# Patient Record
Sex: FEMALE | Race: BLACK OR AFRICAN AMERICAN | NOT HISPANIC OR LATINO | Employment: FULL TIME | URBAN - METROPOLITAN AREA
[De-identification: names, ages, dates, MRNs, and addresses within clinical notes are randomized per-mention and may not be internally consistent; named-entity substitution may affect disease eponyms.]

---

## 2019-09-20 ENCOUNTER — CLINICAL SUPPORT (OUTPATIENT)
Dept: LAB | Facility: HOSPITAL | Age: 29
End: 2019-09-20
Attending: INTERNAL MEDICINE
Payer: COMMERCIAL

## 2019-09-20 ENCOUNTER — TELEPHONE (OUTPATIENT)
Dept: GASTROENTEROLOGY | Facility: CLINIC | Age: 29
End: 2019-09-20

## 2019-09-20 ENCOUNTER — OFFICE VISIT (OUTPATIENT)
Dept: GASTROENTEROLOGY | Facility: CLINIC | Age: 29
End: 2019-09-20
Payer: COMMERCIAL

## 2019-09-20 ENCOUNTER — PATIENT MESSAGE (OUTPATIENT)
Dept: GASTROENTEROLOGY | Facility: CLINIC | Age: 29
End: 2019-09-20

## 2019-09-20 VITALS — DIASTOLIC BLOOD PRESSURE: 85 MMHG | SYSTOLIC BLOOD PRESSURE: 120 MMHG | WEIGHT: 104.94 LBS

## 2019-09-20 DIAGNOSIS — Z51.81 MEDICATION MONITORING ENCOUNTER: ICD-10-CM

## 2019-09-20 DIAGNOSIS — G43.A0 CYCLICAL VOMITING WITH NAUSEA, INTRACTABILITY OF VOMITING NOT SPECIFIED: Primary | ICD-10-CM

## 2019-09-20 DIAGNOSIS — R17 ELEVATED BILIRUBIN: ICD-10-CM

## 2019-09-20 DIAGNOSIS — G43.A0 CYCLICAL VOMITING WITH NAUSEA, INTRACTABILITY OF VOMITING NOT SPECIFIED: ICD-10-CM

## 2019-09-20 PROCEDURE — 99999 PR PBB SHADOW E&M-NEW PATIENT-LVL III: ICD-10-PCS | Mod: PBBFAC,,, | Performed by: INTERNAL MEDICINE

## 2019-09-20 PROCEDURE — 99204 PR OFFICE/OUTPT VISIT, NEW, LEVL IV, 45-59 MIN: ICD-10-PCS | Mod: S$GLB,,, | Performed by: INTERNAL MEDICINE

## 2019-09-20 PROCEDURE — 99999 PR PBB SHADOW E&M-NEW PATIENT-LVL III: CPT | Mod: PBBFAC,,, | Performed by: INTERNAL MEDICINE

## 2019-09-20 PROCEDURE — 93005 ELECTROCARDIOGRAM TRACING: CPT

## 2019-09-20 PROCEDURE — 99204 OFFICE O/P NEW MOD 45 MIN: CPT | Mod: S$GLB,,, | Performed by: INTERNAL MEDICINE

## 2019-09-20 RX ORDER — LORAZEPAM 1 MG/1
1 TABLET ORAL EVERY 8 HOURS PRN
Qty: 20 TABLET | Refills: 0 | Status: SHIPPED | OUTPATIENT
Start: 2019-09-20 | End: 2019-10-20

## 2019-09-20 RX ORDER — ONDANSETRON 4 MG/1
8 TABLET, FILM COATED ORAL 2 TIMES DAILY
COMMUNITY

## 2019-09-20 RX ORDER — PROMETHAZINE HYDROCHLORIDE 12.5 MG/1
12.5 SUPPOSITORY RECTAL EVERY 6 HOURS PRN
Qty: 12 SUPPOSITORY | Refills: 2 | Status: SHIPPED | OUTPATIENT
Start: 2019-09-20

## 2019-09-20 RX ORDER — BISMUTH SUBSALICYLATE 525 MG/30ML
15 LIQUID ORAL EVERY 6 HOURS PRN
COMMUNITY

## 2019-09-20 RX ORDER — DICYCLOMINE HYDROCHLORIDE 10 MG/1
10 CAPSULE ORAL
COMMUNITY

## 2019-09-20 NOTE — TELEPHONE ENCOUNTER
US scheduled on 9/23/19 at [facility] at 5:15pm. 6 week f/u scheduled on 11/5/19 at 4:20pm. Verbal Understanding.

## 2019-09-20 NOTE — PROGRESS NOTES
GASTROENTEROLOGY CLINIC NOTE    Reason for visit: The primary encounter diagnosis was Cyclical vomiting with nausea, intractability of vomiting not specified. Diagnoses of Medication monitoring encounter and Elevated bilirubin were also pertinent to this visit.  Referring provider/PCP: Primary Doctor No      HPI:  Francine Tony is a 29 y.o. female here today for new patient evaluation for vomiting and nausea.   Carries dx of CVS, had workup in [place]. Has had CT, gastric emptying, EGD, US and multiple other studies.     Recently moved here to [facility].  Has been having off and on nausea and vomiting over the past 2-3 weeks.  No dietary triggers, however almost anything she eats makes her vomit.  No significant abdominal pain at present.  No blood in the vomit, no changes in bowel habits, no blood in the stool. Not strictly related to PO intake. Spitting up acid and saliva, and also vomiting. Yellow clear. Sometimes will vomit food 1 hr after eating, food appears partially digested food. Denies any rumination type events. Was previously on lexapro but has now been off. Also tried elavil in the past, but only took for 30 days, although she cannot recall a benefit.  Symptoms started couple years ago. Was seen at [facility] in [place].   Recently seen at [facility] ER for vomiting. Given fluids and zofran and improved and discharged home.    Tried:  Dramamine, zofran, bentyl, phenergan - all help to some degree.  Elavil - only for 1 month, didn't see benefit    Cyclic Vomiting Syndrome  Recurrent episodes of nausea, and emesis:  Yes, on and off x 2 years  Onset of symptoms:  Couple years ago  Frequency: every couple months  Duration:  Few times a week  Symptoms resolve spontaneously: yes  Symptoms free between episodes: yes  Has a prodrome that predicts onset of symptoms: yes, feels tired and upset stomach  History of migraines: no  Family history of migraines: no  Takes prolonged hot baths or showers during episode:  no  Sensitivity to noise: yes  Sensitivity to light: yes  Marijuana use: no  Triggers: tried changing to healthier diet, avoided sodas. No difference.   Episodes associated with stress: not particularly.      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 No family here. Single. No tobacco, no marijuana, has tried CBD oil in past. Socially etoh.     Family hx: no family hx of CRC.      Prior Endoscopy:  EGD: in [place], attempting to get records  Colon: unknown    (Portions of this note were dictated using voice recognition software and may contain dictation related errors in spelling/grammar/syntax not found on text review)    Review of Systems   Constitutional: Positive for malaise/fatigue. Negative for fever and weight loss.   HENT: Negative for nosebleeds and sore throat.    Eyes: Negative for double vision and photophobia.   Respiratory: Negative for cough and shortness of breath.    Cardiovascular: Negative for chest pain and leg swelling.   Gastrointestinal: Positive for nausea and vomiting. Negative for blood in stool, constipation and diarrhea.   Genitourinary: Negative for dysuria and hematuria.   Musculoskeletal: Negative for joint pain and neck pain.   Skin: Negative for itching and rash.   Neurological: Negative for dizziness and headaches.   Psychiatric/Behavioral: Negative for depression, hallucinations and substance abuse. The patient does not have insomnia.        Past Medical History: has no past medical history on file.    Past Surgical History: has no past surgical history on file.    Family History:family history is not on file.    Allergies:   Review of patient's allergies indicates:   Allergen Reactions    Shellfish containing products Hives       Social History: reports that she has never smoked. She has never used smokeless tobacco. She reports that she drinks alcohol. She reports that she does not use drugs.    Home medications:   Current Outpatient  Medications on File Prior to Visit   Medication Sig Dispense Refill    bismuth subsalicylate (PEPTO BISMOL) 262 mg/15 mL suspension Take 15 mLs by mouth every 6 (six) hours as needed for Indigestion.      dicyclomine (BENTYL) 10 MG capsule Take 10 mg by mouth 4 (four) times daily before meals and nightly.      ondansetron (ZOFRAN) 4 MG tablet Take 8 mg by mouth 2 (two) times daily.       No current facility-administered medications on file prior to visit.        Vital signs:  /85   Wt 47.6 kg (104 lb 15 oz)   LMP 08/30/2019     Physical Exam   Constitutional: She is oriented to person, place, and time. No distress.   Thin female , in no apparent distress ; nontoxic appearing   HENT:   Head: Normocephalic and atraumatic.   Eyes: Conjunctivae are normal. No scleral icterus.   Neck: Neck supple. No thyromegaly present.   Cardiovascular: Normal rate, normal heart sounds and intact distal pulses.   Pulmonary/Chest: Effort normal and breath sounds normal. No stridor. No respiratory distress.   Abdominal: Soft. She exhibits no distension and no mass. There is no tenderness. There is no rebound and no guarding.   mildy sensitive to light palpation throughout with rigidity or rebound or guarding   Musculoskeletal: She exhibits no tenderness or deformity.   Neurological: She is alert and oriented to person, place, and time. Gait normal.   Skin: Skin is warm and dry. No rash noted. She is not diaphoretic.   Psychiatric: She has a normal mood and affect. Her behavior is normal.   Vitals reviewed.      Routine labs:  No results found for: WBC, HGB, HCT, MCV, PLT  No results found for: INR  No results found for: IRON, FERRITIN, TIBC, FESATURATED  No results found for: NA, K, CL, CO2, BUN, CREATININE, GLUF  No results found for: ALBUMIN, ALT, AST, GGT, ALKPHOS, BILITOT  No results found for: GLUCOSE    I have reviewed labs from [facility] ER visit, including negative preg, and overall normal labs except mild increase in Tbili  but normal LFTs otherwise.    Will request records from [facility] in [place].      Assessment:    1. Cyclical vomiting with nausea, intractability of vomiting not specified    2. Medication monitoring encounter    3. Elevated bilirubin        Plan:    Orders Placed This Encounter    US Abdomen Limited    Pregnancy, urine rapid    TSH    Tissue transglutaminase, IgA    IgA    Hepatic function panel    SCHEDULED EKG 12-LEAD (to Muse)    LORazepam (ATIVAN) 1 MG tablet    promethazine (PHENERGAN) 12.5 MG Supp    ranitidine (ZANTAC) 150 MG tablet     She has been diagnosed with cyclical vomiting syndrome back at [facility].  It seems like she has had a fairly extensive evaluation, I will try to get these records.  She seems to still be in 1 of her episodes that has been prolonged over the past 2 weeks, and is still having recurrent vomiting. Overall she appears in no acute distress at the present time.    - Ativan for control acutely of the episode   - counseled regarding addictive properties and the need for limited supplies, and counseled against driving or other activities while taking this  - phenergan suppository for acute episode  - continue fluid intake  - will start zantac 150 BID for the maintence, see if GERD is contributing    Check pregnancy test and basic labs  Check EKG given may need to consider elavil down the road and other QTc agents  Check RUQ U/S given elevated Tbili on outside labs done in ER    Eventually after reviewing records, will determine further workup and may need to consider as prophylaxis:  -  Co Q10 200 mg TID , L-carnitine 1 gram BID,  riboflavin 200mg BID,  magnesium 400mg TID  - may need to consider TCA      RTC  6 weeks.      Ancelmo Murphy MD  [facility] [place]

## 2019-09-21 ENCOUNTER — HOSPITAL ENCOUNTER (EMERGENCY)
Facility: HOSPITAL | Age: 29
Discharge: HOME | End: 2019-09-21
Attending: EMERGENCY MEDICINE
Payer: COMMERCIAL

## 2019-09-21 ENCOUNTER — PATIENT MESSAGE (OUTPATIENT)
Dept: GASTROENTEROLOGY | Facility: CLINIC | Age: 29
End: 2019-09-21

## 2019-09-21 VITALS
DIASTOLIC BLOOD PRESSURE: 68 MMHG | HEART RATE: 79 BPM | WEIGHT: 104 LBS | OXYGEN SATURATION: 100 % | TEMPERATURE: 99 F | HEIGHT: 62 IN | BODY MASS INDEX: 19.14 KG/M2 | SYSTOLIC BLOOD PRESSURE: 119 MMHG | RESPIRATION RATE: 18 BRPM

## 2019-09-21 DIAGNOSIS — R11.15 NON-INTRACTABLE CYCLICAL VOMITING WITH NAUSEA: Primary | ICD-10-CM

## 2019-09-21 LAB
BUN SERPL-MCNC: 10 MG/DL (ref 6–30)
CHLORIDE SERPL-SCNC: 101 MMOL/L (ref 95–110)
CREAT SERPL-MCNC: 0.8 MG/DL (ref 0.5–1.4)
GLUCOSE SERPL-MCNC: 89 MG/DL (ref 70–110)
HCT VFR BLD CALC: 42 %PCV (ref 36–54)
POC IONIZED CALCIUM: 1.25 MMOL/L (ref 1.06–1.42)
POC TCO2 (MEASURED): 26 MMOL/L (ref 23–29)
POTASSIUM BLD-SCNC: 3.6 MMOL/L (ref 3.5–5.1)
SAMPLE: NORMAL
SODIUM BLD-SCNC: 140 MMOL/L (ref 136–145)

## 2019-09-21 PROCEDURE — 99284 EMERGENCY DEPT VISIT MOD MDM: CPT | Mod: 25

## 2019-09-21 PROCEDURE — 80047 BASIC METABLC PNL IONIZED CA: CPT

## 2019-09-21 PROCEDURE — 63600175 PHARM REV CODE 636 W HCPCS: Performed by: EMERGENCY MEDICINE

## 2019-09-21 PROCEDURE — 96361 HYDRATE IV INFUSION ADD-ON: CPT

## 2019-09-21 PROCEDURE — 96374 THER/PROPH/DIAG INJ IV PUSH: CPT

## 2019-09-21 PROCEDURE — 99284 EMERGENCY DEPT VISIT MOD MDM: CPT | Mod: ,,, | Performed by: EMERGENCY MEDICINE

## 2019-09-21 PROCEDURE — 99284 PR EMERGENCY DEPT VISIT,LEVEL IV: ICD-10-PCS | Mod: ,,, | Performed by: EMERGENCY MEDICINE

## 2019-09-21 RX ORDER — METOCLOPRAMIDE 10 MG/1
10 TABLET ORAL EVERY 6 HOURS
Qty: 15 TABLET | Refills: 0 | Status: SHIPPED | OUTPATIENT
Start: 2019-09-21

## 2019-09-21 RX ORDER — METOCLOPRAMIDE HYDROCHLORIDE 5 MG/ML
10 INJECTION INTRAMUSCULAR; INTRAVENOUS
Status: COMPLETED | OUTPATIENT
Start: 2019-09-21 | End: 2019-09-21

## 2019-09-21 RX ADMIN — SODIUM CHLORIDE 1000 ML: 0.9 INJECTION, SOLUTION INTRAVENOUS at 11:09

## 2019-09-21 RX ADMIN — METOCLOPRAMIDE 10 MG: 5 INJECTION, SOLUTION INTRAMUSCULAR; INTRAVENOUS at 11:09

## 2019-09-21 NOTE — ED TRIAGE NOTES
Intermittent n/v x3 weeks. Seen at [facility] 9/8/19, given reglan IV in ED, discharged with zofran and bentyl. Pt reports no relief from symptoms. Seen by GI yesterday, given ativan, promethazine, and ranitidine. Pt still reports no relief from symptoms. Appetite still adequate but pt reports difficulty keeping anything down. Pt denies diarrhea.

## 2019-09-21 NOTE — ED PROVIDER NOTES
"Encounter Date: 9/21/2019    SCRIBE #1 NOTE: I, Shiloh Shepherd, am scribing for, and in the presence of,  Dr. Ledezma. I have scribed the following portions of the note - Other sections scribed: HPI, ROS, PE.       History     Chief Complaint   Patient presents with    Emesis     Time patient was seen by the provider: 10:38 AM      The patient is a 29 y.o. female with medical history of cyclical vomiting syndrome who presents to the ED with a complaint of emesis. Patient reports persistent nausea and vomiting 3 weeks. She states light and sound makes her feel like she has to vomit. She has been unable to tolerate PO, last meal was yesterday. She states, "I'm just vomiting stomach acid now." She reports history of similar presentation, as this is a chronic problem. She has had workup in [place] with CT, gastric emptying,E GD, US and multiple other studies. She was seen the in the ED on 09/08 and provided with zofran and bentyl with no relief. She followed up with GI yesterday and was provided ativan, promethazine and zantac with no relief. She states in the past dramamine relieved her symptoms, but no longer works. Patient also endorses lightheadedness. She denies fever, no hematemesis,  changes in stools, urinary changes. No other medical problems.    The history is provided by the patient and medical records.     Review of patient's allergies indicates:   Allergen Reactions    Shellfish containing products Hives     History reviewed. No pertinent past medical history.  History reviewed. No pertinent surgical history.  History reviewed. No pertinent family history.  Social History     Tobacco Use    Smoking status: Never Smoker    Smokeless tobacco: Never Used   Substance Use Topics    Alcohol use: Yes     Frequency: 2-3 times a week    Drug use: Never     Review of Systems   Constitutional: Negative for fever.   HENT: Negative for sore throat.    Respiratory: Negative for shortness of breath.  "   Cardiovascular: Negative for chest pain.   Gastrointestinal: Positive for nausea and vomiting. Negative for diarrhea.   Genitourinary: Negative for dysuria.   Musculoskeletal: Negative for back pain.   Skin: Negative for rash.   Neurological: Positive for light-headedness. Negative for weakness.   Hematological: Does not bruise/bleed easily.       Physical Exam     Initial Vitals [09/21/19 1023]   BP Pulse Resp Temp SpO2   133/64 108 16 98.4 °F (36.9 °C) 98 %      MAP       --         Physical Exam    Nursing note and vitals reviewed.  Constitutional: She appears well-developed and well-nourished. She is not diaphoretic. No distress.   HENT:   Head: Normocephalic and atraumatic.   Mouth/Throat: Oropharynx is clear and moist.   Eyes: Conjunctivae and EOM are normal.   Neck: Normal range of motion. Neck supple.   Cardiovascular: Normal rate and regular rhythm.   Pulmonary/Chest: Breath sounds normal. No respiratory distress.   Abdominal: Soft. She exhibits no distension. There is no tenderness. There is no guarding.   Musculoskeletal: Normal range of motion. She exhibits no edema.   Neurological: She is alert and oriented to person, place, and time. No cranial nerve deficit.   Skin: Skin is warm and dry. No rash noted.         ED Course   Procedures  Labs Reviewed   ISTAT PROCEDURE   ISTAT PROCEDURE          Imaging Results    None          Medical Decision Making:   History:   Old Medical Records: I decided to obtain old medical records.  Initial Assessment:   29 y.o. HX of cycli vomiting p/w worsening nbnb emesis. VSSWNL. PE abd benign, nttp throughout.   Differential Diagnosis:   Ddx includes likley cyclical vomiting, hyponatremia, hypokalemia, biliary cholic although lowerlikelihood considering HX.   Clinical Tests:   Lab Tests: Ordered and Reviewed  ED Management:  Plan : antiemetic, fluids, Istat check of electrolytes, reassess.             Scribe Attestation:   Scribe #1: I performed the above scribed service  and the documentation accurately describes the services I performed. I attest to the accuracy of the note.            ED Course as of Sep 26 2207   Sat Sep 21, 2019   1144 Pt mentions starting to feel improved.  No further episodes of vomiting since medications.  Will continue to reassess following fluids.  Reassess    [DC]   1237 Following fluids feeling back to baseline, no further vomiting, will d/c home w/ f/u instructions. Pt has f/u scheduled for next week, has no further questions, d/c home.     [DC]      ED Course User Index  [DC] Harlan Ledezma Jr., MD     Clinical Impression:       ICD-10-CM ICD-9-CM   1. Non-intractable cyclical vomiting with nausea G43.A0 536.2         Disposition:   Disposition: Discharged  Condition: Stable       I, Harlan Ledezma,  personally performed the services described in this documentation. All medical record entries made by the scribe were at my direction and in my presence.  I have reviewed the chart and agree that the record reflects my personal performance and is accurate and complete. Harlan Ledezma Jr., MD  09/26/19 2208       Harlan Ledezma Jr., MD  09/26/19 2208

## 2019-09-21 NOTE — ED NOTES
Patient identifiers verified and correct for Francine Tony.    LOC: The patient is awake and alert; oriented x 3 and speaking appropriately.  APPEARANCE: Patient resting comfortably, patient is clean and well groomed.  SKIN: warm and dry, normal skin turgor & moist mucus membranes, skin intact, no breakdown noted.  MUSCULOSKELETAL: Patient moving all extremities well, no obvious swelling or deformities noted.  RESPIRATORY: Airway is open and patent, breath sounds clear throughout all lung fields; respirations are spontaneous, normal effort and rate.  CARDIAC: Patient has a normal rate, no peripheral edema noted, capillary refill < 3 seconds; No complaints of chest pain.   ABDOMEN: Soft and non tender to palpation, no distention noted. Pt reports intermittent n/v x3 weeks. [facility] clinic already. Prescribed multiple medications not providing relief. Pt retching at this time, given emesis bag.    Partner at bedside. Will continue to monitor.

## 2019-09-23 ENCOUNTER — TELEPHONE (OUTPATIENT)
Dept: GASTROENTEROLOGY | Facility: CLINIC | Age: 29
End: 2019-09-23

## 2019-09-23 DIAGNOSIS — G43.A0 CYCLICAL VOMITING WITH NAUSEA, INTRACTABILITY OF VOMITING NOT SPECIFIED: Primary | ICD-10-CM

## 2019-09-23 LAB
BUN SERPL-MCNC: 10 MG/DL (ref 6–30)
CHLORIDE SERPL-SCNC: 102 MMOL/L (ref 95–110)
CREAT SERPL-MCNC: 0.8 MG/DL (ref 0.5–1.4)
GLUCOSE SERPL-MCNC: 91 MG/DL (ref 70–110)
HCT VFR BLD CALC: 41 %PCV (ref 36–54)
POC IONIZED CALCIUM: 1.22 MMOL/L (ref 1.06–1.42)
POC TCO2 (MEASURED): 26 MMOL/L (ref 23–29)
POTASSIUM BLD-SCNC: 3.7 MMOL/L (ref 3.5–5.1)
SAMPLE: NORMAL
SODIUM BLD-SCNC: 139 MMOL/L (ref 136–145)

## 2019-09-23 RX ORDER — SUCRALFATE 1 G/1
1 TABLET ORAL
Qty: 90 TABLET | Refills: 1 | Status: SHIPPED | OUTPATIENT
Start: 2019-09-23

## 2019-09-23 NOTE — TELEPHONE ENCOUNTER
----- Message from Vito Agee sent at 9/20/2019  7:25 PM CDT -----  Contact: Pt  Pt would like to be called back regarding      With Provider: santos varela            Preferred Date Range: 9/20/2019 - 9/23/2019            Preferred Times: Any time            Reason for visit: Vomiting and Nausea            Comments:      Constant vomiting and upset stomach      Pt can be reached at [phone].    Thank You.

## 2019-09-23 NOTE — PROGRESS NOTES
I have received an egd report from [place] , provided by patient.   Awaiting other records.    egd 9/2017  Bile reflux into stomach.  There is mention of sharp angulation at duodenal sweep.  I wonder if this is potentially causing some of her vomiting issues.    Will work this up further with upper GI series, to be scheduled when her vomiting is under better control.   Will give trial of carafate, instructed to crush and mix with small water to create slurry, this is for bile reflux.

## 2019-09-23 NOTE — TELEPHONE ENCOUNTER
----- Message from Ancelmo Murphy MD sent at 9/23/2019 11:28 AM CDT -----  Please see note.  I want to schedule upper gi series, but only once she is better from this episode. (there was mention of tight turn after exiting her stomach on her outside EGD)  Also called into pharmacy, carafate tabs, instruct to crush and add couple drops of water to make slurry. This may help her symptoms based on bile reflux seen on her outside EGD.

## 2019-09-23 NOTE — TELEPHONE ENCOUNTER
Spoke with patient and reviewed er course.  She is feeling  Better.  Needs to reschedule her labs / US    She was given reglan by ER. Also zantac helping.    Declined any other medications to help at moment, she is keeping down some bland foods.    Thanked me for call and informed me she will keep us updated to her symptoms.

## 2019-09-23 NOTE — Clinical Note
Please see note.I want to schedule upper gi series, but only once she is better from this episode. (there was mention of tight turn after exiting her stomach on her outside EGD)Also called into pharmacy, carafate tabs, instruct to crush and add couple drops of water to make slurry. This may help her symptoms based on bile reflux seen on her outside EGD.

## 2019-09-27 ENCOUNTER — DOCUMENTATION ONLY (OUTPATIENT)
Dept: GASTROENTEROLOGY | Facility: CLINIC | Age: 29
End: 2019-09-27

## 2019-09-27 NOTE — PROGRESS NOTES
Received records from [facility].    Reviewed and summarized as below... Year 2017    NM emptying - normal 2 and 4 hours    CT head normal    EGD - bile reflux and sharp angulation at duodenal sweep.    Fasting cortisol - 7.4

## 2019-12-03 ENCOUNTER — TELEPHONE (OUTPATIENT)
Dept: GASTROENTEROLOGY | Facility: CLINIC | Age: 29
End: 2019-12-03

## 2019-12-03 NOTE — TELEPHONE ENCOUNTER
Patient NOP Showed for appointment today. Left a message on VM to call the clinic back to reschedule.